# Patient Record
Sex: MALE | Race: WHITE
[De-identification: names, ages, dates, MRNs, and addresses within clinical notes are randomized per-mention and may not be internally consistent; named-entity substitution may affect disease eponyms.]

---

## 2018-04-11 ENCOUNTER — HOSPITAL ENCOUNTER (INPATIENT)
Dept: HOSPITAL 74 - JER | Age: 68
LOS: 1 days | Discharge: HOME | DRG: 225 | End: 2018-04-12
Admitting: INTERNAL MEDICINE
Payer: COMMERCIAL

## 2018-04-11 VITALS — BODY MASS INDEX: 19.8 KG/M2

## 2018-04-11 DIAGNOSIS — I69.354: ICD-10-CM

## 2018-04-11 DIAGNOSIS — R93.8: ICD-10-CM

## 2018-04-11 DIAGNOSIS — E78.5: ICD-10-CM

## 2018-04-11 DIAGNOSIS — I35.0: ICD-10-CM

## 2018-04-11 DIAGNOSIS — K35.80: Primary | ICD-10-CM

## 2018-04-11 DIAGNOSIS — I10: ICD-10-CM

## 2018-04-11 LAB
ALBUMIN SERPL-MCNC: 3.7 G/DL (ref 3.4–5)
ALP SERPL-CCNC: 125 U/L (ref 45–117)
ALT SERPL-CCNC: 30 U/L (ref 12–78)
ANION GAP SERPL CALC-SCNC: 8 MMOL/L (ref 8–16)
AST SERPL-CCNC: 19 U/L (ref 15–37)
BASOPHILS # BLD: 1.1 % (ref 0–2)
BILIRUB SERPL-MCNC: 0.2 MG/DL (ref 0.2–1)
BUN SERPL-MCNC: 19 MG/DL (ref 7–18)
CALCIUM SERPL-MCNC: 8.5 MG/DL (ref 8.5–10.1)
CHLORIDE SERPL-SCNC: 105 MMOL/L (ref 98–107)
CO2 SERPL-SCNC: 25 MMOL/L (ref 21–32)
CREAT SERPL-MCNC: 1 MG/DL (ref 0.7–1.3)
DEPRECATED RDW RBC AUTO: 17.3 % (ref 11.9–15.9)
EOSINOPHIL # BLD: 0.5 % (ref 0–4.5)
GLUCOSE SERPL-MCNC: 132 MG/DL (ref 74–106)
HCT VFR BLD CALC: 34.5 % (ref 35.4–49)
HGB BLD-MCNC: 11.5 GM/DL (ref 11.7–16.9)
INR BLD: 1.09 (ref 0.82–1.09)
LYMPHOCYTES # BLD: 17.7 % (ref 8–40)
MAGNESIUM SERPL-MCNC: 1.8 MG/DL (ref 1.8–2.4)
MCH RBC QN AUTO: 25.5 PG (ref 25.7–33.7)
MCHC RBC AUTO-ENTMCNC: 33.3 G/DL (ref 32–35.9)
MCV RBC: 76.6 FL (ref 80–96)
MONOCYTES # BLD AUTO: 5.8 % (ref 3.8–10.2)
NEUTROPHILS # BLD: 74.9 % (ref 42.8–82.8)
PLATELET # BLD AUTO: 278 K/MM3 (ref 134–434)
PMV BLD: 7.6 FL (ref 7.5–11.1)
POTASSIUM SERPLBLD-SCNC: 3.6 MMOL/L (ref 3.5–5.1)
PROT SERPL-MCNC: 7.7 G/DL (ref 6.4–8.2)
PT PNL PPP: 12.3 SEC (ref 9.98–11.88)
RBC # BLD AUTO: 4.5 M/MM3 (ref 4–5.6)
SODIUM SERPL-SCNC: 138 MMOL/L (ref 136–145)
WBC # BLD AUTO: 11.1 K/MM3 (ref 4–10)

## 2018-04-11 NOTE — PDOC
Attending Attestation





- HPI


HPI: 





04/11/18 21:04


The patient is a 67 year old male with a significant PMH of stroke (6 months ago

), unspecified heart issues, and hyperlipidemia who presents to the emergency 

department with left arm tingling and RLQ abdominal pain over the past day. He 

reports taking an Aspirin at about 12PM to minimal relief. He reports he has 

not taken his prescribed hyperlipidemia medications in about 3 days. 





Allergies: NKA


PCP: None reported. 





<Matthew Fong - Last Filed: 04/11/18 23:11>





- Resident


Resident Name: Raul Callaway





- ED Attending Attestation


I have performed the following: I have examined & evaluated the patient, The 

case was reviewed & discussed with the resident, I agree w/resident's findings 

& plan, Exceptions are as noted





- Physicial Exam


PE: 





04/12/18 19:16


*Physical Exam





General Appearance: Yes: Appropriately Dressed.  No: Apparent Distress, 

Intoxicated


HEENT: positive: EOMI, JASON, Normal ENT Inspection, Normal Voice, TMs Normal, 

Pharynx Normal.  negative: Pale Conjunctivae, Photophobia, Scleral Icterus (R), 

Scleral Icterus (L)


Neck: positive: Trachea midline, Normal Thyroid, Supple.  negative: Tender, 

Rigid, Carotid bruit, Stridor, Lymphadenopathy (R), Lymphadenopathy (L), 

Thyromegaly


Respiratory/Chest: positive: Lungs Clear, Normal Breath Sounds.  negative: 

Chest Tender, Respiratory Distress, Accessory Muscle Use, Labored Respiration, 

RES, Crackles, Rales, Rhonchi, Stridor, Wheezing, Dullness


Cardiovascular: positive: Regular Rhythm, Regular Rate, S1, S2.  negative: Edema

, JVD, Murmur, Bradycardia, Tachycardia


Vascular Pulses: Dorsalis-Pedis (R): 2+, Doralis-Pedis (L): 2+


Gastrointestinal/Abdominal: positive: Normal Bowel Sounds, Flat, Soft. RLQ 

tenderness negative:  Organomegaly, Pulsatile Mass, Increased Bowel Sounds, 

Decreased BS, Distended, Guarding, Rebound, Hernia, Hepatomegaly, Spleenomegaly


Lymphatic: negative: Adenopathy, Tenderness


Musculoskeletal: positive: Normal Inspection.  negative: CVA Tenderness, 

Decreased Range of Motion


Extremity: positive: Normal Capillary Refill, Normal Inspection, Normal Range 

of Motion, Pelvis Stable.  negative: Tender, Pedal Edema, Swelling, Erythema


Integumentary: positive: Normal Color, Dry, Warm.  negative: Cyanotic, Erythema

, Jaundice, Rash


Neurologic: positive: CNs II-XII NML intact, Fully Oriented, Alert, Normal Mood/

Affect, Motor Strength 5/5.  negative: EOM Palsy, Facial Droop, Sensory Deficit











- Medical Decision Making





04/11/18 23:14


Pt to be admitted.  





<Lester Sweeney - Last Filed: 04/12/18 19:17>

## 2018-04-11 NOTE — PDOC
Rapid Medical Evaluation


Time Seen by Provider: 04/11/18 20:27


Medical Evaluation: 





04/11/18 20:27


I have performed a brief in-person evaluation of this patient. 


The patient presents with a chief complaint of: pain radiating from RLQ to left 

chest down to left arm, SOB, blurry vision, dizziness, headache.  had stroke 6 

months ago, has not taken cholesterol meds in 3 days, weakness to L side, per 

daughter in law "he seems slower"


Pertinent physical exam findings: A&O x 3, no focal neuro deficits, left /

107, R 193/113, 


I have ordered the following: labs, ekg, cxr


The patient will proceed to the ED for further evaluation.








**Discharge Disposition





- Diagnosis


 Chest pain








- Referrals





- Patient Instructions





- Post Discharge Activity

## 2018-04-11 NOTE — PDOC
History of Present Illness





- General


Chief Complaint: Pain, Acute


Stated Complaint: PAIN


Time Seen by Provider: 04/11/18 20:27





- History of Present Illness


Initial Comments: 





04/11/18 21:10


Mr. Calvin Blackburn is a 66 yo male w/ pmh of HTN, HLD, stroke approximately 6 

months ago with residual left arm weakness, stenosis of heart vessel currently 

being medically managed (patient unable to provide further information) who 

presents to ER complaining of 1 day history of RLQ abdominal pain and chest pain

/tightness and parasthesias radiating down left arm.





The patient denies shortness of breath, headache and dizziness. Denies fever, 

chills, nausea, vomit, diarrhea and constipation. Denies dysuria, frequency, 

urgency and hematuria. 





Allergies: NKDA





Past History





- Past Medical History


Allergies/Adverse Reactions: 


 Allergies











Allergy/AdvReac Type Severity Reaction Status Date / Time


 


No Known Allergies Allergy   Verified 04/11/18 20:37











Home Medications: 


Ambulatory Orders





NK [No Known Home Medication]  04/11/18 








COPD: No





- Suicide/Smoking/Psychosocial Hx


Smoking History: Never smoked


Have you smoked in the past 12 months: No


Information on smoking cessation initiated: No


Hx Alcohol Use: No


Drug/Substance Use Hx: No





**Review of Systems





- Review of Systems


Comments:: 





04/11/18 21:13


GENERAL/CONSTITUTIONAL: No fever or chills. No weakness.


HEAD, EYES, EARS, NOSE AND THROAT: No change in vision. No ear pain or 

discharge. No sore throat.


CARDIOVASCULAR: +Chest pain as described. No shortness of breath


RESPIRATORY: No cough, wheezing, or hemoptysis.


GASTROINTESTINAL: +RLQ pain. No nausea, vomiting, diarrhea or constipation.


GENITOURINARY: No dysuria, frequency, or change in urination.


MUSCULOSKELETAL: +Left arm tingling. No change from baseline strength post 

stroke per patient/family.


SKIN: No rash


NEUROLOGIC: No headache, vertigo, loss of consciousness, or change in strength/

sensation.


ENDOCRINE: No increased thirst. No abnormal weight change


HEMATOLOGIC/LYMPHATIC: No anemia, easy bleeding, or history of blood clots.


ALLERGIC/IMMUNOLOGIC: No hives or skin allergy.





*Physical Exam





- Vital Signs


 Last Vital Signs











Temp Pulse Resp BP Pulse Ox


 


 97.8 F   100 H  16   207/107   98 


 


 04/11/18 20:29  04/11/18 20:29  04/11/18 20:29  04/11/18 20:29  04/11/18 20:29














- Physical Exam


Comments: 





04/11/18 21:14


GENERAL: Awake, alert, and fully oriented, in no acute distress


HEAD: No signs of trauma, normocephalic, atraumatic 


EYES: PERRLA, EOMI, sclera anicteric, conjunctiva clear


ENT: Auricles normal inspection, hearing grossly normal, nares patent, 

oropharynx clear without


exudates. Moist mucosa


NECK: Normal ROM, supple, no lymphadenopathy, JVD, or masses


LUNGS: No distress, speaks full sentences, clear to auscultation bilaterally 


HEART: Regular rate and rhythm, normal S1 and S2, no murmurs, rubs or gallops, 

peripheral pulses normal and equal bilaterally. 


ABDOMEN: +TTP in RLQ. Soft, normoactive bowel sounds. No guarding, no rebound. 

No masses


EXTREMITIES: Normal inspection, Normal range of motion, no edema. No clubbing 

or cyanosis. 


NEUROLOGICAL: Cranial nerves II through XII grossly intact. Normal speech, 

normal gait, no focal sensorimotor deficits 


SKIN: Warm, Dry, normal turgor, no rashes or lesions noted. 


04/11/18 23:14








ED Treatment Course





- LABORATORY


CBC & Chemistry Diagram: 


 04/11/18 20:53





 04/11/18 20:53





- ADDITIONAL ORDERS


Additional order review: 


 











  04/11/18





  20:53


 


RBC  4.50


 


MCV  76.6 L


 


MCHC  33.3


 


RDW  17.3 H


 


MPV  7.6


 


Neutrophils %  74.9


 


Lymphocytes %  17.7


 


Monocytes %  5.8


 


Eosinophils %  0.5


 


Basophils %  1.1














- RADIOLOGY


Radiology Studies Ordered: 














 Category Date Time Status


 


 ABDOMEN & PELVIS CT W/O CONTR [CT] Stat CT Scan  04/11/18 20:56 Ordered


 


 HEAD CT WITHOUT CONTRAST [CT] Stat CT Scan  04/11/18 20:56 Ordered














- Medications


Given in the ED: 


ED Medications














Discontinued Medications














Generic Name Dose Route Start Last Admin





  Trade Name Freq  PRN Reason Stop Dose Admin


 


Aspirin  162 mg  04/11/18 20:30  04/11/18 21:02





  Asa -  PO  04/11/18 20:31  162 mg





  ONCE ONE   Administration














Medical Decision Making





- Medical Decision Making





04/11/18 23:17


Mr. Calvin Blackburn is a 66 yo male w/ pmh as described who presents w/ RLQ 

pain with left hand parasthesias for 1 day. Head and abdominal CT ordered with 

labs for evaluation.





Head CT negative for acute process. Abdominal CT positive for thickened tubular 

structure in right lower pelvis suspicious for acute appendicitis. Surgery 

consulted (Dr. Vela) who will evaluate patient. Hospitalist paged for 

admission.





04/11/18 23:32


Patient admitted to medicine.





*DC/Admit/Observation/Transfer


Diagnosis at time of Disposition: 


Appendicitis


Qualifiers:


 Appendicitis type: acute appendicitis Acute appendicitis type: unspecified 

acute appendicitis type Qualified Code(s): K35.80 - Unspecified acute 

appendicitis








- Discharge Dispostion


Admit: Yes





- Referrals





- Patient Instructions





- Post Discharge Activity

## 2018-04-11 NOTE — PN
Teaching Attending Note


Name of Resident: Taty Martinez





ATTENDING PHYSICIAN STATEMENT





I saw and evaluated the patient.


I reviewed the resident's note and discussed the case with the resident.


I agree with the resident's findings and plan as documented.








SUBJECTIVE:


67 M with Pmhx. fo HTN, HLD, Stroke (6 months ago, with residual L. arm weakness

), Stenosis of Heart Valve? With RLQ abdominal pain. States he does not have 

any chest pain or pressure. No headaches or dizziness. Does note RLQ abdominal 

pain. No fevers or chills.





OBJECTIVE:


Physical:


VS:


 Vital Signs











 Period  Temp  Pulse  Resp  BP Sys/Young  Pulse Ox


 


 Last 24 Hr  97.8 F    16-18  175-207/  96-98








GEN: NAD, Resting in bed, AA0X3


HEENT: NCAT, PERRL, Throat without erythema or exudates


CARD: RRR S1, S2


RESP: CTAB


ABD: BSx4, NTD to palpation


EXT: - C/C/E





 CBCD











WBC  11.1 K/mm3 (4.0-10.0)  H  04/11/18  20:53    


 


RBC  4.50 M/mm3 (4.00-5.60)   04/11/18  20:53    


 


Hgb  11.5 GM/dL (11.7-16.9)  L  04/11/18  20:53    


 


Hct  34.5 % (35.4-49)  L  04/11/18  20:53    


 


MCV  76.6 fl (80-96)  L  04/11/18  20:53    


 


MCHC  33.3 g/dl (32.0-35.9)   04/11/18  20:53    


 


RDW  17.3 % (11.9-15.9)  H  04/11/18  20:53    


 


Plt Count  278 K/MM3 (134-434)   04/11/18  20:53    


 


MPV  7.6 fl (7.5-11.1)   04/11/18  20:53    








 CMP











Sodium  138 mmol/L (136-145)   04/11/18  20:53    


 


Potassium  3.6 mmol/L (3.5-5.1)   04/11/18  20:53    


 


Chloride  105 mmol/L ()   04/11/18  20:53    


 


Carbon Dioxide  25 mmol/L (21-32)   04/11/18  20:53    


 


Anion Gap  8  (8-16)   04/11/18  20:53    


 


BUN  19 mg/dL (7-18)  H  04/11/18  20:53    


 


Creatinine  1.0 mg/dL (0.7-1.3)   04/11/18  20:53    


 


Creat Clearance w eGFR  > 60  (>60)   04/11/18  20:53    


 


Random Glucose  132 mg/dL ()  H  04/11/18  20:53    


 


Calcium  8.5 mg/dL (8.5-10.1)   04/11/18  20:53    


 


Total Bilirubin  0.2 mg/dL (0.2-1.0)   04/11/18  20:53    


 


AST  19 U/L (15-37)   04/11/18  20:53    


 


ALT  30 U/L (12-78)   04/11/18  20:53    


 


Alkaline Phosphatase  125 U/L ()  H  04/11/18  20:53    


 


Total Protein  7.7 g/dl (6.4-8.2)   04/11/18  20:53    


 


Albumin  3.7 g/dl (3.4-5.0)   04/11/18  20:53    








 CARDIAC ENZYMES











Creatine Kinase  84 IU/L ()   04/11/18  20:53    


 


Troponin I  < 0.02 ng/ml (0.00-0.05)   04/11/18  20:53    








CXR:PENDING


EKG: NSR Q waves inferior leads








Ambulatory Orders





NK [No Known Home Medication]  04/11/18 








CT HEAD- Chronic R. frontoparietal infarct


Small left basal ganglia infarcts


CT ABD/PELVIS: Mildly thickened tubular structure within RLL Pelvis, possible 

acute appy. Athrosclerotic AV calcificationa


Mild localized bulging of infrarenal aorta without anyeurysm.





ASSESSMENT AND PLAN:





67 M with Pmhx. fo HTN, HLD, Stroke (6 months ago, with residual L. arm weakness

), Stenosis of Heart Valve? With RLQ abdominal pain and chest pain, being 

admitted for ACS rule out and acute appendicitis


1.) Acute Appendicitis


- NPO


- Zosyn


- ID consult


- Sx. Consult


- Type & Screen


- Coags





2.) Hx. of CVA 


- ASA recieved in ED


- CT HEAD as Above


- C/W Statin


- [facility]





3.) AV Stenosis


- ECHO





4.) Dvt Ppx


- Scds





Place in [facility]

## 2018-04-12 VITALS — TEMPERATURE: 97.6 F | DIASTOLIC BLOOD PRESSURE: 94 MMHG | HEART RATE: 76 BPM | SYSTOLIC BLOOD PRESSURE: 164 MMHG

## 2018-04-12 LAB
ALBUMIN SERPL-MCNC: 3.2 G/DL (ref 3.4–5)
ALP SERPL-CCNC: 102 U/L (ref 45–117)
ALT SERPL-CCNC: 26 U/L (ref 12–78)
ANION GAP SERPL CALC-SCNC: 7 MMOL/L (ref 8–16)
AST SERPL-CCNC: 18 U/L (ref 15–37)
BILIRUB SERPL-MCNC: 0.4 MG/DL (ref 0.2–1)
BUN SERPL-MCNC: 15 MG/DL (ref 7–18)
CALCIUM SERPL-MCNC: 8.6 MG/DL (ref 8.5–10.1)
CHLORIDE SERPL-SCNC: 108 MMOL/L (ref 98–107)
CO2 SERPL-SCNC: 26 MMOL/L (ref 21–32)
CREAT SERPL-MCNC: 0.9 MG/DL (ref 0.7–1.3)
DEPRECATED RDW RBC AUTO: 17.2 % (ref 11.9–15.9)
GLUCOSE SERPL-MCNC: 89 MG/DL (ref 74–106)
HCT VFR BLD CALC: 32.8 % (ref 35.4–49)
HGB BLD-MCNC: 11.1 GM/DL (ref 11.7–16.9)
MCH RBC QN AUTO: 25.9 PG (ref 25.7–33.7)
MCHC RBC AUTO-ENTMCNC: 33.8 G/DL (ref 32–35.9)
MCV RBC: 76.8 FL (ref 80–96)
PLATELET # BLD AUTO: 284 K/MM3 (ref 134–434)
PMV BLD: 7.8 FL (ref 7.5–11.1)
POTASSIUM SERPLBLD-SCNC: 3.4 MMOL/L (ref 3.5–5.1)
PROT SERPL-MCNC: 6.8 G/DL (ref 6.4–8.2)
RBC # BLD AUTO: 4.27 M/MM3 (ref 4–5.6)
SODIUM SERPL-SCNC: 141 MMOL/L (ref 136–145)
WBC # BLD AUTO: 6.6 K/MM3 (ref 4–10)

## 2018-04-12 PROCEDURE — 0DTJ4ZZ RESECTION OF APPENDIX, PERCUTANEOUS ENDOSCOPIC APPROACH: ICD-10-PCS

## 2018-04-12 NOTE — EKG
Test Reason : 

Blood Pressure : ***/*** mmHG

Vent. Rate : 105 BPM     Atrial Rate : 105 BPM

   P-R Int : 182 ms          QRS Dur : 088 ms

    QT Int : 352 ms       P-R-T Axes : [phone] degrees

   QTc Int : 465 ms

 

SINUS TACHYCARDIA

VOLTAGE CRITERIA FOR LEFT VENTRICULAR HYPERTROPHY

POSSIBLE INFERIOR INFARCT , AGE UNDETERMINED

ABNORMAL ECG

NO PREVIOUS ECGS AVAILABLE

Confirmed by LEENA CM MD ([id number]) on 4/12/2018 9:49:42 AM

 

Referred By:             Confirmed By:LEENA CM MD

## 2018-04-12 NOTE — OP
DATE OF OPERATION:  04/12/2018

 

PREOPERATIVE DIAGNOSIS:  Acute appendicitis.

 

POSTOPERATIVE DIAGNOSIS:  Acute appendicitis.

 

PROCEDURE:  Laparoscopic appendectomy.

 

ATTENDING SURGEON:  Hank Vela MD

 

ASSISTANT:  No one.

 

ANESTHESIOLOGIST:  Vlad Altamirano MD

 

ANESTHESIA TYPE:  General with local.  Local consisted of 0.5% Marcaine, 10 mL given

in area block fashion at the port sites.

 

ESTIMATED BLOOD LOSS:  10 mL

 

ESTIMATED INTRAVENOUS FLUID ADMINISTERED:  Crystalloid 1000 mL.

 

OUTPUT:  Levy drained 300 mL of urine and was removed postop.

 

BRIEF INDICATION:  Patient is a 67-year-old male presenting with right lower quadrant

abdominal pain for a period of 3 days.  He had a CAT scan that confirmed the presence

of acute appendicitis and mild leukocytosis of 11.1.  He was counseled regarding the

need for a laparoscopic appendectomy, possible open, signed informed consent after

being explained the risks, benefits, and alternatives in native language of [place]ian

and he was taken to the procedure.

 

DESCRIPTION OF PROCEDURE:  Patient was brought to the operating room.  He was placed

in supine position on the operating table with the right arm extended at 90 degrees

perpendicular to the body's axis and the left arm tucked and rolled.  The patient had

lower extremities' SCDs placed, had received intravenous antibiotics of ceftriaxone

prior to surgery, was induced with general anesthesia, endotracheally intubated

without event by Anesthesia.  The anterior abdominal wall was shaved, prepped, and

draped in standard surgical fashion.  A formal timeout was completed, identifying the

operative site and the procedure.  At which point, we proceeded first with a Merlene

entry in the supraumbilical position.  A 15 blade was used to incise the skin for a

12-mm trocar.  It was deepened and widened through the subcutaneous tissue to the

anterior rectus sheath.  It was identified and then incised with the Bovie cautery

and scored, elevated into the surgical field with Kochers, and then, an entry into

the abdomen was made atraumatically.  We installed a 12-mm trocar in the

supraumbilical position and established a pneumoperitoneum of 15 mmHg.  At which

point, we proceeded with inspection of the site.  There appeared to be no

unintentional damage to the interior abdominal viscera.  Two additional 5-mm ports

were placed at the suprapubic position and at the anterior-superior iliac spine and

at the mid-clavicular line, a third port.  We then proceeded with placing the patient

into Trendelenburg position to allow for the bowel to fall away from the cecum.  The

cecum was identified and traced with its teniae back to the base of the appendix. 

The appendix base was grasped and elevated.  A plane was developed between the base

of the appendix and the mesoappendix.  At which point, we re-sited the camera to the

left lower quadrant port and visualized a spot where we would place an Endo TEODORO

stapler 60 with a blue load to transect the base of the appendix.  This stapler was

fired across the base under direct visualization.  With this cleared, a second staple

firing of the mesoappendix using a white load Endo TEODORO size 60 mm was fired across

the mesoappendix.  The appendix was then retrieved from the abdomen using an Endo

Catch bag size 10 mm from the umbilicus.  This was done under direct visualization. 

There appeared to be no significant blood loss, and hemostasis was obtained

throughout the field.  A small amount of free air, edema fluid, and staples were

suctioned from the abdomen.  The patient was then returned to level position.  We

removed the ports under direct visualization.  The umbilical port was closed with a 0

Vicryl in interrupted fashion using a figure-of-8 to ablate the umbilical port, and

the skin was closed with subcuticular 4-0 Vicryl.  The skin was cleaned.  Sterile

dressings were placed.  The patient was awoken from general anesthesia, having

tolerated the procedure well.  Counts were correct postoperatively.  He returned to

recovery room in stable condition.

 

 

MD BRUNILDA Amaya/[id number]

DD: 04/12/2018 14:17

DT: 04/12/2018 14:52

Job #:  [id number]

## 2018-04-12 NOTE — PN
Teaching Attending Note


Name of Resident: Daryn Orr





ATTENDING PHYSICIAN STATEMENT





I saw and evaluated the patient.


I reviewed the resident's note and discussed the case with the resident.


I agree with the resident's findings and plan as documented.








SUBJECTIVE:


Patient is c/o having RLQ pain , No fever or chills at this time. 





OBJECTIVE:





 Vital Signs











Temperature  98.0 F   04/12/18 09:07


 


Pulse Rate  75   04/12/18 09:07


 


Respiratory Rate  18   04/12/18 09:07


 


Blood Pressure  138/92   04/12/18 09:07


 


O2 Sat by Pulse Oximetry (%)  99   04/12/18 09:07








 CBCD











WBC  6.6 K/mm3 (4.0-10.0)  D 04/12/18  07:02    


 


RBC  4.27 M/mm3 (4.00-5.60)   04/12/18  07:02    


 


Hgb  11.1 GM/dL (11.7-16.9)  L  04/12/18  07:02    


 


Hct  32.8 % (35.4-49)  L  04/12/18  07:02    


 


MCV  76.8 fl (80-96)  L  04/12/18  07:02    


 


MCHC  33.8 g/dl (32.0-35.9)   04/12/18  07:02    


 


RDW  17.2 % (11.9-15.9)  H  04/12/18  07:02    


 


Plt Count  284 K/MM3 (134-434)   04/12/18  07:02    


 


MPV  7.8 fl (7.5-11.1)   04/12/18  07:02    








 CMP











Sodium  141 mmol/L (136-145)   04/12/18  07:00    


 


Potassium  3.4 mmol/L (3.5-5.1)  L  04/12/18  07:00    


 


Chloride  108 mmol/L ()  H  04/12/18  07:00    


 


Carbon Dioxide  26 mmol/L (21-32)   04/12/18  07:00    


 


Anion Gap  7  (8-16)  L  04/12/18  07:00    


 


BUN  15 mg/dL (7-18)  D 04/12/18  07:00    


 


Creatinine  0.9 mg/dL (0.7-1.3)   04/12/18  07:00    


 


Creat Clearance w eGFR  > 60  (>60)   04/12/18  07:00    


 


Random Glucose  89 mg/dL ()  D 04/12/18  07:00    


 


Calcium  8.6 mg/dL (8.5-10.1)   04/12/18  07:00    


 


Total Bilirubin  0.4 mg/dL (0.2-1.0)  D 04/12/18  07:00    


 


AST  18 U/L (15-37)   04/12/18  07:00    


 


ALT  26 U/L (12-78)   04/12/18  07:00    


 


Alkaline Phosphatase  102 U/L ()   04/12/18  07:00    


 


Total Protein  6.8 g/dl (6.4-8.2)   04/12/18  07:00    


 


Albumin  3.2 g/dl (3.4-5.0)  L  04/12/18  07:00    








 CARDIAC ENZYMES











Creatine Kinase  84 IU/L ()   04/11/18  20:53    


 


Troponin I  < 0.02 ng/ml (0.00-0.05)   04/12/18  03:29    








 


 Home Medications











 Medication  Instructions  Recorded


 


NK [No Known Home Medication]  04/11/18








 Current Medications











Generic Name Dose Route Start Last Admin





  Trade Name Freq  PRN Reason Stop Dose Admin


 


Acetaminophen  650 mg  04/12/18 00:26  





  Tylenol -  PO   





  Q4H PRN   





  PAIN LEVEL 4 - 6   


 


Potassium Chloride  10 meq in 100 mls @ 100 mls/hr  04/12/18 08:45  





  Potassium Chloride 10 Meq Premix Ivpb -  IVPB  04/12/18 11:44  





  Q60M AL   


 


Ceftriaxone Sodium 1 gm/  100 mls @ 200 mls/hr  04/12/18 10:00  





  Dextrose  IVPB   





  DAILY AL   


 


Metronidazole  500 mg in 100 mls @ 100 mls/hr  04/12/18 10:00  





  Flagyl 500mg Premixed Ivpb -  IVPB   





  Q8H-IV AL   


 


Metoprolol Tartrate  5 mg  04/12/18 09:38  





  Lopressor Injection -  IVPUSH   





  Q4H PRN   





  HYPERTENSION   














CT HEAD- Chronic R. frontoparietal infarct


Small left basal ganglia infarcts


CT ABD/PELVIS: Mildly thickened tubular structure within RLL Pelvis, possible 

acute appy. Athrosclerotic AV calcificationa


Mild localized bulging of infrarenal aorta without anyeurysm.





ASSESSMENT AND PLAN:


Patient is a 67 M with Pmhx. fo HTN, HLD, Stroke (6 months ago, with left 

residual residual weakness), presented with RLQ abdominal pain  being admitted 

for acute appendicitis.





# Acute appendicitis can't r/o On Iv Rocephin and Flagyl


# Latent questional TB , Ct of the chest ordered, quanteron ordered.


# HTN uncontrolled on Lopressor IV now with parameters, Labetolol IV was given 

earlier. 


going to OR discussed with the surgeon and ID

## 2018-04-12 NOTE — OP
Operative Note





- Note:


Operative Date: 04/12/18


Pre-Operative Diagnosis: acute appendicitis


Operation: laparoscopic cholecystectomy


Findings: 





inflammed appendix


Post-Operative Diagnosis: Same as Pre-op


Surgeon: Hank Vela


Anesthesiologist/CRNA: Vlad Altamirano


Anesthesia: General, Local (0.5% marcaine 10ml)


Specimens Removed: appendix


Estimated Blood Loss (mls): 10


Drains, Volume Out (mls): 300 (flores (removed))


Fluid Volume Replaced (mls): 1,000 (crystalloid )


Operative Report Dictated: Yes

## 2018-04-12 NOTE — CONSULT
Consult


Consult Specialty:: general surgery 


Reason for Consultation:: acute appendicitis





- History of Present Illness


Chief Complaint: abdominal pain


History of Present Illness: 


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 

unspecified heart problem, presents to the ED with 3 day onset of 5/10, 

intermittent, non-radiating, RLQ abdominal pain. No exacerbating or alleviate 

factors. Patient states abdominal pain only occurs when pressing on it. Patient 

denies chest pain, nausea, vomiting, fevers, diarrhea, melena, cough, dysuria, 

SOB. Patient also states he has not seen a cardiologist or neurologist since 

his CVA 6 months ago. CT scan of the abdomen shows and inflammed appendix. we 

were asked to assess. 








- History Source


History Provided By: Patient, Medical Record


Limitations to Obtaining History: No Limitations





- Past Medical History


CNS: Yes: CVA


Cardio/Vascular: Yes: HTN, Hyperlipdemia





- Alcohol/Substance Use


Hx Alcohol Use: No





- Smoking History


Smoking history: Never smoked


Have you smoked in the past 12 months: No





Home Medications





- Allergies


Allergies/Adverse Reactions: 


 Allergies











Allergy/AdvReac Type Severity Reaction Status Date / Time


 


No Known Allergies Allergy   Verified 04/11/18 20:37














- Home Medications


Home Medications: 


Ambulatory Orders





NK [No Known Home Medication]  04/11/18 











Review of Systems





- Review of Systems


Constitutional: denies: Chills, Fever


Eyes: denies: Blurred Vision, Recent Change in Vision


HENT: denies: Difficult Swallowing, Throat Pain


Neck: denies: Pain on Movement, Tenderness


Cardiovascular: denies: Chest Pain, Palpitations


Respiratory: denies: Cough, SOB


Gastrointestinal: reports: Abdominal Pain.  denies: Constipation, Diarrhea


Genitourinary: denies: Discharge, Dysuria


Breasts: reports: No Symptoms Reported.  denies: Pain


Musculoskeletal: denies: Muscle Pain, Muscle Weakness


Integumentary: denies: Lesions, Rash


Neurological: denies: Confusion, Dizziness, Seizure, Syncope


Endocrine: denies: Unexplained Weight Gain, Unexplained Weight Loss


Hematology/Lymphatic: denies: Easily Bruised, Excessive Bleeding


Psychiatric: denies: Anxiety, Depression





Physical Exam


Vital Signs: 


 Vital Signs











Temperature  98.7 F   04/11/18 23:49


 


Pulse Rate  78   04/11/18 23:49


 


Respiratory Rate  18   04/11/18 23:49


 


Blood Pressure  181/84   04/11/18 23:49


 


O2 Sat by Pulse Oximetry (%)  98   04/11/18 23:49








 Vital Signs











 Period  Temp  Pulse  Resp  BP Sys/Young  Pulse Ox


 


 Last 24 Hr  97.8 F-98.7 F    16-18  138-207/  96-99











Constitutional: Yes: Well Nourished, No Distress, Calm


Eyes: Yes: Conjunctiva Clear, EOM Intact


HENT: Yes: Atraumatic, Normocephalic


Neck: Yes: Supple, Trachea Midline


Cardiovascular: Yes: Regular Rate and Rhythm, S1, S2.  No: Murmur


Respiratory: Yes: Regular, CTA Bilaterally


Gastrointestinal: Yes: Normal Bowel Sounds, Soft.  No: Tenderness


...Rectal Exam: Yes: Deferred


Renal/: No: CVA Tenderness - Left, CVA Tenderness - Right


Musculoskeletal: No: Muscle Pain, Muscle Weakness


Extremities: No: Cool, Cyanosis


Edema: No


Peripheral Pulses WNL: Yes


Wound/Incision: Yes: Clean/Dry, Well Approximated


Neurological: Yes: Alert, Oriented


Psychiatric: Yes: Alert, Oriented


Labs: 


 CBC, BMP





 04/11/18 20:53 





 04/11/18 20:53 











Imaging





- Results


Cat Scan: Report Reviewed, Image Reviewed (inflammed appendix tubular structure 

RLQ towards pelvis)





Problem List





- Problems


(1) Appendicitis


Assessment/Plan: 


68 yo male with MMP pesents with RLQ abdominal pain, CT consistent with acute 

appendicitis, mild initial leukocutosis 11.1





NPO and IVF hydration 


IV antibotics 


Medical optimization prior to emergency procedure 


Discussed with patient risks, benefits and alternatives of laparoscopic 

possible open appendectomy, including but not limited to bleeding, infection, 

injury to adjacent structures, leak or injury, intraabdominal abscess, need for 

further procedures, death; alternatives include antibiotics, delayed or no 

surgery - risks of this include failure of nonoperative therapy, perforation, 

sepsis, recurrence, death.


Patient desires to proceed with operation - will take to OR for above. Informed 

consent signed for same.








Thank you for the opportunity to participate in the care of this patient.


Code(s): K37 - UNSPECIFIED APPENDICITIS   


Qualifiers: 


   Appendicitis type: acute appendicitis   Acute appendicitis type: unspecified 

acute appendicitis type   Qualified Code(s): K35.80 - Unspecified acute 

appendicitis   





(2) HTN (hypertension)


Code(s): I10 - ESSENTIAL (PRIMARY) HYPERTENSION   


Qualifiers: 


   Hypertension type: essential hypertension   Qualified Code(s): I10 - 

Essential (primary) hypertension   





(3) Hyperlipidemia


Code(s): E78.5 - HYPERLIPIDEMIA, UNSPECIFIED   


Qualifiers: 


   Hyperlipidemia type: unspecified   Qualified Code(s): E78.5 - Hyperlipidemia

, unspecified   





(4) History of CVA in adulthood


Code(s): Z86.73 - PRSNL HX OF TIA (TIA), AND CEREB INFRC W/O RESID DEFICITS

## 2018-04-12 NOTE — DS
Physical Exam: 


SUBJECTIVE: Patient seen and examined at bedside. No new complaints. Pain 

controlled. 








OBJECTIVE:





 Vital Signs











 Period  Temp  Pulse  Resp  BP Sys/Young  Pulse Ox


 


 Last 24 Hr  97.6 F-98.7 F    16-18  108-207/  








PHYSICAL EXAM





GENERAL: The patient is awake, alert, and fully oriented, in no acute distress.


HEAD: Normal with no signs of trauma.


EYES: PERRL, extraocular movements intact, sclera anicteric, conjunctiva clear. 


NECK: Trachea midline, full range of motion, supple. 


LUNGS: Breath sounds equal, clear to auscultation bilaterally, no wheezes, no 

crackles, no accessory muscle use. 


HEART: Regular rate and rhythm, S1, S2 without murmur, rub or gallop.


ABDOMEN: Soft, nondistended, normoactive bowel sounds, no guarding. Tenderness 

to palpation on the right side of the abdomen in both upper and lower quadrants


EXTREMITIES: 2+ pulses, warm, well-perfused, no edema. 


NEUROLOGICAL: Cranial nerves II through X grossly intact. Normal speech, gait 

not observed.


PSYCH: Normal mood, normal affect.


SKIN: Warm, dry, normal turgor, no rashes or lesions noted.





LABS


 Laboratory Results - last 24 hr











  04/11/18 04/11/18 04/11/18





  20:53 20:53 20:53


 


WBC  11.1 H  


 


RBC  4.50  


 


Hgb  11.5 L  


 


Hct  34.5 L  


 


MCV  76.6 L  


 


MCH  25.5 L  


 


MCHC  33.3  


 


RDW  17.3 H  


 


Plt Count  278  


 


MPV  7.6  


 


Neutrophils %  74.9  


 


Lymphocytes %  17.7  


 


Monocytes %  5.8  


 


Eosinophils %  0.5  


 


Basophils %  1.1  


 


Retic Count   


 


PT with INR   12.30 H 


 


INR   1.09 


 


Sodium    138


 


Potassium    3.6


 


Chloride    105


 


Carbon Dioxide    25


 


Anion Gap    8


 


BUN    19 H


 


Creatinine    1.0


 


Creat Clearance w eGFR    > 60


 


Random Glucose    132 H


 


Calcium    8.5


 


Magnesium    1.8


 


Total Bilirubin    0.2


 


AST    19


 


ALT    30


 


Alkaline Phosphatase    125 H


 


Creatine Kinase    84


 


Troponin I    < 0.02


 


C-Reactive Protein   


 


Total Protein    7.7


 


Albumin    3.7


 


HIV 1&2 Antibody Screen   


 


HIV P24 Antigen   


 


Blood Type   


 


Antibody Screen   














  04/11/18 04/12/18 04/12/18





  23:10 03:29 07:00


 


WBC   


 


RBC   


 


Hgb   


 


Hct   


 


MCV   


 


MCH   


 


MCHC   


 


RDW   


 


Plt Count   


 


MPV   


 


Neutrophils %   


 


Lymphocytes %   


 


Monocytes %   


 


Eosinophils %   


 


Basophils %   


 


Retic Count   


 


PT with INR   


 


INR   


 


Sodium    141


 


Potassium    3.4 L


 


Chloride    108 H


 


Carbon Dioxide    26


 


Anion Gap    7 L


 


BUN    15  D


 


Creatinine    0.9


 


Creat Clearance w eGFR    > 60


 


Random Glucose    89  D


 


Calcium    8.6


 


Magnesium   


 


Total Bilirubin    0.4  D


 


AST    18


 


ALT    26


 


Alkaline Phosphatase    102


 


Creatine Kinase   


 


Troponin I   < 0.02 


 


C-Reactive Protein    3.1 H


 


Total Protein    6.8


 


Albumin    3.2 L


 


HIV 1&2 Antibody Screen   


 


HIV P24 Antigen   


 


Blood Type  O POSITIVE  


 


Antibody Screen  Negative  














  04/12/18 04/12/18 04/12/18





  07:00 07:02 07:02


 


WBC   6.6  D 


 


RBC   4.27 


 


Hgb   11.1 L 


 


Hct   32.8 L 


 


MCV   76.8 L 


 


MCH   25.9 


 


MCHC   33.8 


 


RDW   17.2 H 


 


Plt Count   284 


 


MPV   7.8 


 


Neutrophils %   


 


Lymphocytes %   


 


Monocytes %   


 


Eosinophils %   


 


Basophils %   


 


Retic Count    1.47


 


PT with INR   


 


INR   


 


Sodium   


 


Potassium   


 


Chloride   


 


Carbon Dioxide   


 


Anion Gap   


 


BUN   


 


Creatinine   


 


Creat Clearance w eGFR   


 


Random Glucose   


 


Calcium   


 


Magnesium   


 


Total Bilirubin   


 


AST   


 


ALT   


 


Alkaline Phosphatase   


 


Creatine Kinase   


 


Troponin I   


 


C-Reactive Protein  Cancelled  


 


Total Protein   


 


Albumin   


 


HIV 1&2 Antibody Screen   


 


HIV P24 Antigen   


 


Blood Type   


 


Antibody Screen   














  04/12/18 04/12/18





  13:20 15:00


 


WBC  


 


RBC  


 


Hgb  


 


Hct  


 


MCV  


 


MCH  


 


MCHC  


 


RDW  


 


Plt Count  


 


MPV  


 


Neutrophils %  


 


Lymphocytes %  


 


Monocytes %  


 


Eosinophils %  


 


Basophils %  


 


Retic Count  


 


PT with INR  


 


INR  


 


Sodium  


 


Potassium  3.8 


 


Chloride  


 


Carbon Dioxide  


 


Anion Gap  


 


BUN  


 


Creatinine  


 


Creat Clearance w eGFR  


 


Random Glucose  


 


Calcium  


 


Magnesium  


 


Total Bilirubin  


 


AST  


 


ALT  


 


Alkaline Phosphatase  


 


Creatine Kinase  


 


Troponin I  


 


C-Reactive Protein  


 


Total Protein  


 


Albumin  


 


HIV 1&2 Antibody Screen   Negative


 


HIV P24 Antigen   Negative


 


Blood Type  


 


Antibody Screen  











HOSPITAL COURSE:





Date of Admission:04/11/18


The patient is a 66 yo M with Pmhx of HTN, HLD, CVA (6 months ago with residual 

left arm weakness), who presented to the ED c/o 3 day hx of 5/10, intermittent, 

non-radiating, RLQ abdominal pain. In the ED, the patient was found to be in 

moderate pain as well as have a BP of 207/107. A ct of the abdomen was 

suspicious for appendicitis. Surgery was consulted. Cardiology was consulted. 

Infectious disease was consulted. The patient's pain was controlled with IV 

tylenol. He was pre medicated with zosyn and vancomycin. his BP was controlled 

with a one time dose of IV labetolol. The patient was taken to the OR for a 

laparoscopic appendectomy with Dr. Vela. The patient tolerated the procedure 

well and was discharged the same day with instructions for follow up with Dr. Vela. He was given a prescription for Percocet for pain control.  





Date of Discharge: 04/12/18











Minutes to complete discharge: 39





Discharge Summary


Reason For Visit: APPENDICITIS


Condition: Improved





- Instructions


Diet, Activity, Other Instructions: 


Postoperative instructions: You had a laparoscopic appendectomy on 4/11/20118 

by Dr. Hank Vela of [facility].


 


Activity: Resume your usual activities gradually, but no heavy exertion or 

lifting more than 10-15 pounds for 1 month. Remove dressings 48 hours after 

surgery; sticky tapes underneath will fall off by themselves. You may shower 

daily starting then, just pat the incision areas dry. Eat lightly at first, but 

advance to your usual diet as tolerated.


 


Pain: For pain, you may use and alternate Tylenol (acetaminophen) and/or 

ibuprofen every 6 hours each as needed; this means that you can take one OR the 

other at 3-hour intervals. If you are prescribed a Tylenol/narcotic combination 

for severe pain, use it instead of plain Tylenol as needed and switch back when 

your pain starts decreasing. Do not take more than 4000mg of acetaminophen in a 

day. Take medications as prescribed or indicated on the labeling.


 


Follow-up: Call Dr. Vela' office at [phone] to make your postop 

appointment (Wednesday ~2 weeks after surgery). Clinic is held in the 

[facility] on the first floor of [facility].





Call the office or return to the emergency department if you have:


* increasing pain not responsive to pain medication


* fever of 101F or higher


* vomiting


* unusual or increasing bleeding or drainage from wounds


* increasing redness or swelling at wound sites


* inability to urinate





Also, see your primary medical doctor within 1-2 weeks.





You were also having some difficulty with some tendons in your shoulder. If you 

wish, you may see an orthopedist for this. Information for Dr. Watson has been 

included in your discharge paperwork. 





You should resume taking the rest of your home medications as prescribed


Referrals: 


Hank Vela MD [Staff Physician] - 


Disposition: HOME





- Home Medications


Comprehensive Discharge Medication List: 


Ambulatory Orders





Aspirin [ASA -] 325 mg PO DAILY 04/12/18 


Carvedilol 25 mg PO DAILY 04/12/18 


Hydralazine HCl 50 mg PO TID 04/12/18 


Oxycodone HCl/Acetaminophen [Percocet 5/325 -] 1 - 2 tab PO Q6H #40 tab MDD 6 04 /12/18 


Pravastatin Sodium 20 mg PO DAILY 04/12/18 








This patient is new to me today: Yes


Date on this admission: 04/12/18


Emergency Visit: Yes


ED Registration Date: 04/11/18


Care time: The patient presented to the Emergency Department on the above date 

and was hospitalized for further evaluation of their emergent condition.


Critical Care patient: No





- Discharge Referral


Referred to [facility] No

## 2018-04-12 NOTE — HP
CHIEF COMPLAINT: abdominal pain





PCP: n/a





HISTORY OF PRESENT ILLNESS: Patient is a 68 yo M with Pmhx of HTN, HLD, CVA (6 

months ago with residual left arm weakness), unspecified heart problem, 

presents to the ED with 3 day onset of 5/10, intermittent, non-radiating, RLQ 

abdominal pain. No exacerbating or alleviate factors. Patient states abdominal 

pain only occurs when pressing on it. Patient denies chest pain, nausea, 

vomiting, fevers, diarrhea, melena, cough, dysuria, SOB. Patient also states he 

has not seen a cardiologist or neurologist since his CVA 6 months ago. He 

currently does not recall the medications he's on. 








ER course was notable for:


(1) Abd CT suspicious for acute appendicitis.


(2) Vanc/Zosyn


(3) /107, given 20mg IV labetolol








Recent Travel: n/a





PAST MEDICAL HISTORY: per HPI





PAST SURGICAL HISTORY: n/a





Social History:


Smoking: denies


Alcohol: occasional


Drugs: denies





Family History:


Allergies





No Known Allergies Allergy (Verified 04/11/18 20:37)


 








HOME MEDICATIONS:


 Home Medications











 Medication  Instructions  Recorded


 


NK [No Known Home Medication]  04/11/18








REVIEW OF SYSTEMS


CONSTITUTIONAL: 


Absent:  fever, chills, diaphoresis, generalized weakness, malaise, loss of 

appetite, weight change


HEENT: 


Absent:  rhinorrhea, nasal congestion, throat pain, throat swelling, difficulty 

swallowing, mouth swelling, ear pain, eye pain, visual changes


CARDIOVASCULAR: 


Absent: chest pain, syncope, palpitations, irregular heart rate, lightheadedness

, peripheral edema


RESPIRATORY: 


Absent: cough, shortness of breath, dyspnea with exertion, orthopnea, wheezing, 

stridor, hemoptysis


GASTROINTESTINAL:


Absent: abdominal pain, abdominal distension, nausea, vomiting, diarrhea, 

constipation, melena, hematochezia


GENITOURINARY: 


Absent: dysuria, frequency, urgency, hesitancy, hematuria, flank pain, genital 

pain


MUSCULOSKELETAL: 


Absent: myalgia, arthralgia, joint swelling, back pain, neck pain


SKIN: 


Absent: rash, itching, pallor


HEMATOLOGIC/IMMUNOLOGIC: 


Absent: easy bleeding, easy bruising, lymphadenopathy, frequent infections


ENDOCRINE:


Absent: unexplained weight gain, unexplained weight loss, heat intolerance, 

cold intolerance


NEUROLOGIC: left arm weakness, paresthesias, headaches


Absent:  dizziness, unsteady gait, seizure, mental status changes, bladder or 

bowel incontinence


PSYCHIATRIC: 


Absent: anxiety, depression, suicidal or homicidal ideation, hallucinations.








PHYSICAL EXAMINATION


 Vital Signs - 24 hr











  04/11/18 04/11/18 04/11/18





  20:29 21:09 21:12


 


Temperature 97.8 F  


 


Pulse Rate 100 H  


 


Pulse Rate [   83





Radial]   


 


Respiratory 16 18 17





Rate   


 


Blood Pressure 207/107  


 


Blood Pressure  197/96 175/83





[Left Arm]   


 


O2 Sat by Pulse 98 96 96





Oximetry (%)   














  04/11/18 04/11/18





  21:22 23:49


 


Temperature  98.7 F


 


Pulse Rate  


 


Pulse Rate [ 89 78





Radial]  


 


Respiratory  18





Rate  


 


Blood Pressure  


 


Blood Pressure 180/91 181/84





[Left Arm]  


 


O2 Sat by Pulse 98 98





Oximetry (%)  











GENERAL: Awake, alert, and fully oriented, in no acute distress.


HEAD: Normal with no signs of trauma.


EYES: extraocular movements intact


EARS, NOSE, THROAT:  oropharynx clear without exudates. Moist mucous membranes.


NECK: supple without lymphadenopathy, JVD, or masses.


LUNGS: Breath sounds equal, clear to auscultation bilaterally. No wheezes, and 

no crackles. No accessory muscle use.


HEART: Regular rate and rhythm, 2/6 systolic murmur


ABDOMEN: +BS, soft, RLQ tenderness to palpation, negative rovsings, obturator, 

and psoa sign  


MUSCULOSKELETAL: 5/5 strength throughout b/l, normal range of motion throughout


UPPER EXTREMITIES: left hand tremor, 2+ pulses, warm, well-perfused.  No 

peripheral edema.


LOWER EXTREMITIES: 2+ pulses, warm, well-perfused. No calf tenderness. No 

peripheral edema. 


NEUROLOGICAL:  Cranial nerves II-XII intact. normal heal to shin, normal right 

finger to nose, abnormal r left finger to nose





 Laboratory Results - last 24 hr











  04/11/18 04/11/18 04/11/18





  20:53 20:53 20:53


 


WBC  11.1 H  


 


RBC  4.50  


 


Hgb  11.5 L  


 


Hct  34.5 L  


 


MCV  76.6 L  


 


MCH  25.5 L  


 


MCHC  33.3  


 


RDW  17.3 H  


 


Plt Count  278  


 


MPV  7.6  


 


Neutrophils %  74.9  


 


Lymphocytes %  17.7  


 


Monocytes %  5.8  


 


Eosinophils %  0.5  


 


Basophils %  1.1  


 


PT with INR   12.30 H 


 


INR   1.09 


 


Sodium    138


 


Potassium    3.6


 


Chloride    105


 


Carbon Dioxide    25


 


Anion Gap    8


 


BUN    19 H


 


Creatinine    1.0


 


Creat Clearance w eGFR    > 60


 


Random Glucose    132 H


 


Calcium    8.5


 


Magnesium    1.8


 


Total Bilirubin    0.2


 


AST    19


 


ALT    30


 


Alkaline Phosphatase    125 H


 


Creatine Kinase    84


 


Troponin I    < 0.02


 


Total Protein    7.7


 


Albumin    3.7

















EKG: NSR Q waves inferior leads


CT HEAD- Chronic R. frontoparietal infarct, Small left basal ganglia infarcts


CT ABD/PELVIS: Mildly thickened tubular structure within RLL Pelvis, possible 

acute appendicitis. Athrosclerotic AV calcification, Mild localized bulging of 

infrarenal aorta without anyeurysm.





ASSESSMENT/PLAN:








 68 yo M with Pmhx of HTN, HLD, CVA (6 months ago with residual left arm 

weakness), unspecified heart problem, presents to the ED with 3 day RLQ 

abdominal pain admitted for R/o ACS and acute appendicitis. 








#Acute Appendicitis


-CT abd: suspicious for appendicitis


-NPO


-Vanc/Zosyn in ER


-Tylenol PRN for pain control


-ID consult: Dr. Tolentino


-Surgery Consult


-Type & Screen


-Coags








#Hx. of CVA 


-ASA recieved in ED


-CT HEAD as Above


-C/W Statin


-Echo ordered


-Son says possible Echo/Carotids done in [facility]


-requires med rec





#HTN/HLD


-Labetolol 20mg IV in ER


-requires med rec





#FEN


-No IV fluids


-WNL


-NPO 








#Dvt Ppx


-Scds





Place in Med Sx




















Visit type





- Emergency Visit


Emergency Visit: Yes


ED Registration Date: 04/11/18


Care time: The patient presented to the Emergency Department on the above date 

and was hospitalized for further evaluation of their emergent condition.





- New Patient


This patient is new to me today: Yes


Date on this admission: 04/12/18





- Critical Care


Critical Care patient: No





Hospitalist Screening





- Colonoscopy Questionnaire


Colonoscopy Questionnaire: 





Colonoscopy Questionnaire








-   Patient:


50 - 75 years old and never had a screening colonoscopy: Unknown


History of colon or rectal polyps, or CA: Unknown


History of IBD, Crohn's disease or UC: Unknown


History of abdominal radiation therapy as a child: Unknown





-   Relative:


1 with colon or rectal CA, or polyps at age 60 or younger: Unknown


Colon or rectal CA diagnosed at age 45 or younger: Unknown


Multiple relatives with colon or rectal CA: Unknown





-   Outcome:


Screening Result: Negative Screen

## 2018-04-12 NOTE — EKG
Test Reason : 

Blood Pressure : ***/*** mmHG

Vent. Rate : 078 BPM     Atrial Rate : 078 BPM

   P-R Int : 174 ms          QRS Dur : 090 ms

    QT Int : 388 ms       P-R-T Axes : 069 047 023 degrees

   QTc Int : 442 ms

 

NORMAL SINUS RHYTHM

VOLTAGE CRITERIA FOR LEFT VENTRICULAR HYPERTROPHY

CANNOT RULE OUT INFERIOR INFARCT (CITED ON OR BEFORE 11-APR-2018)

ABNORMAL ECG

WHEN COMPARED WITH ECG OF 11-APR-2018 20:35,

NONSPECIFIC T WAVE ABNORMALITY NOW EVIDENT IN INFERIOR LEADS

Confirmed by LEENA CM MD ([id number]) on 4/12/2018 1:28:22 PM

 

Referred By:             Confirmed By:LEENA CM MD

## 2018-04-12 NOTE — PN
Progress Note (short form)





- Note


Progress Note: 





ID





Full noted dictated





Impression





Possible appendicitis





Abnormal chest xray ? old TB





Plan


O R  Dr Vela


Ceftriaxone and metronidazole


CRP


HIV test


Quant gold


CT chest





Ron FIGUEROA





Problem List





- Problems


(1) Appendicitis


Code(s): K37 - UNSPECIFIED APPENDICITIS   


Qualifiers: 


   Appendicitis type: acute appendicitis   Acute appendicitis type: unspecified 

acute appendicitis type   Qualified Code(s): K35.80 - Unspecified acute 

appendicitis   





(2) Abnormal chest xray


Code(s): R93.8 - ABNORMAL FINDINGS ON DIAGNOSTIC IMAGING OF BODY STRUCTURES

## 2018-04-12 NOTE — PN
Progress Note (short form)





- Note


Progress Note: 








Unable to consult on patient.  Was in the OR this morning and was discharged 

post-op.

## 2018-04-12 NOTE — CONS
INFECTIOUS DISEASE CONSULTATION

 

DATE OF CONSULTATION:

 

DATE OF DICTATION:  04/12/2018

 

This is a 67-year-old male originally from [facility], who presents to the hospital

with a 3-day history of intermittent, nonradiating pain in the right lower quadrant. 

He was seen in the ER by Dr. Vela and had a CAT scan of the abdomen obtained which

shows what was thought to be an inflamed appendix.  He is currently scheduled to go

to the OR for an appendectomy.  He has no fever, and his white count is mildly

elevated.  At the current time, his abdominal pain has apparently improved since he

first came in to the emergency room.  He has no chills and no fever.

 

PAST MEDICAL HISTORY:  Includes hypertension, hyperlipidemia, status post CVA.

 

CURRENT MEDICATIONS:  Tylenol.

 

ALLERGIES:  None known.

 

SOCIAL HISTORY:  Nonsmoker.  No history of substance abuse, EtOH.  ,

unemployed.

 

FAMILY HISTORY:  Noncontributory.

 

REVIEW OF SYSTEMS:  Noncontributory.

 

PHYSICAL EXAMINATION:

General:  He was a well-nourished appearing male in no acute distress.

Vital Signs:  The temperature was 98, pulse 75, blood pressure 138/92, respirations

18.

Neck:  Supple.

Lungs:  Clear to auscultation.

Heart:  S1, S2.  Regular rhythm.  No audible murmur.

Abdomen:  Soft with mild tenderness noted in the right lower quadrant.  No guarding. 

No rebound.

Extremities:  No clubbing, cyanosis, or edema.

 

DIAGNOSTIC DATA:  The white count was 11.1, hemoglobin 11.5, platelets of 278. 

Chemistries within normal limit.  Two sets of blood cultures obtained, currently

pending.

 

CT of the abdomen shows mildly thickened tubular structure in the right lower pelvis

suspicious for appendicitis.

 

Chest x-ray was reviewed and shows pleural reaction with scarring, possible fibrosis,

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

 

ASSESSMENT:  A 67-year-old male with findings clinically and per CAT scan imaging

consistent with appendicitis.  The patient will be taken to the operating room for

appendectomy per Dr. Vela.  Regarding antibiotics, he does not look acutely ill.  We

will give ceftriaxone and metronidazole.  Lastly, he has a markedly abnormal x-ray. 

This was discussed with him, and he noted that in [facility] 40 years ago he may have

been treated for latent tuberculosis.

 

PLAN:  In this regard, he will have a QuantiFERON Gold obtained and a CAT scan of the

chest for further delineation of the abnormal chest x-ray, CRP, and HIV testing.

 

 

MIGUEL A ZUÑIGA M.D.

 

[id number]

DD: 04/12/2018 09:40

DT: 04/12/2018 10:04

Job #:  [id number]

## 2018-04-13 LAB
SERUM IRON SATURATION: 9 % (ref 15–55)
TIBC SERPL-MCNC: 359 UG/DL (ref 250–450)
UIBC SERPL-MCNC: 326 UG/DL (ref 111–343)

## 2018-04-16 NOTE — PATH
Surgical Pathology Report



Patient Name:  SUSAN RETANA

Accession #:  [id number]

Med. Rec. #:  [id number]                                                        

   /Age/Gender:  1950 (Age: 67) / M

Account:  [id number]                                                          

             Location: EMERGENCY ROOM

Taken:  2018

Received:  2018

Reported:  2018

Physicians:  Hank Vela M.D.

  



Specimen(s) Received

 APPENDIX 





Clinical History

Acute appendicitis







Final Diagnosis

APPENDIX, APPENDECTOMY:

ACUTE APPENDICITIS AND PERIAPPENDICITIS.  





***Electronically Signed***

Omer Cason M.D.





Gross Description

Received in formalin, labeled "appendix," is a 6 x 1 x 1 cm. in length vermiform

appendix with a stapled margin of resection and minimal attached fat. The serosa

is tan and focally hemorrhagic. Sectioning reveals focally hemorrhagic lumen.

The wall of the appendix averages 0.2 cm. in thickness. Representative sections

are submitted in one cassettes. 

PRINCE/2018



oxana/2018